# Patient Record
Sex: MALE | Race: WHITE | URBAN - METROPOLITAN AREA
[De-identification: names, ages, dates, MRNs, and addresses within clinical notes are randomized per-mention and may not be internally consistent; named-entity substitution may affect disease eponyms.]

---

## 2023-10-25 PROBLEM — C44.619 BASAL CELL CARCINOMA OF SKIN OF LEFT UPPER LIMB, INCLUDING SHOULDER: Status: ACTIVE | Noted: 2023-06-14

## 2023-10-25 PROBLEM — C44.319 BASAL CELL CARCINOMA OF SKIN OF OTHER PARTS OF FACE: Status: ACTIVE | Noted: 2023-06-14

## 2023-10-25 PROBLEM — L57.0 ACTINIC KERATOSIS: Status: ACTIVE | Noted: 2023-06-14

## 2023-10-25 PROBLEM — D48.5 NEOPLASM OF UNCERTAIN BEHAVIOR OF SKIN: Status: ACTIVE | Noted: 2023-06-14

## 2023-10-27 ENCOUNTER — PROCEDURE VISIT (OUTPATIENT)
Dept: DERMATOLOGY | Facility: CLINIC | Age: 58
End: 2023-10-27
Payer: MEDICARE

## 2023-10-27 DIAGNOSIS — D48.5 NEOPLASM OF UNCERTAIN BEHAVIOR OF SKIN: ICD-10-CM

## 2023-10-27 PROCEDURE — 88305 TISSUE EXAM BY PATHOLOGIST: CPT | Performed by: DERMATOLOGY

## 2023-10-27 PROCEDURE — 88305 TISSUE EXAM BY PATHOLOGIST: CPT | Mod: TC,DER | Performed by: STUDENT IN AN ORGANIZED HEALTH CARE EDUCATION/TRAINING PROGRAM

## 2023-10-27 PROCEDURE — 11440 EXC FACE-MM B9+MARG 0.5 CM/<: CPT | Performed by: STUDENT IN AN ORGANIZED HEALTH CARE EDUCATION/TRAINING PROGRAM

## 2023-10-27 RX ORDER — ASPIRIN 81 MG/1
81 TABLET ORAL DAILY
COMMUNITY

## 2023-10-27 NOTE — PROGRESS NOTES
Slow Mohs Surgery Note    Date of Surgery:  10/27/2023  Surgeon:  Nicholas Marvin MD  Office Location: [facility] [address] [address]  Dept: [phone]  Dept Fax: [phone]  Referring Provider: Nicholas Marvin MD  [facility] [address] [address]     Assessment/Plan   This is planned multi-stage excision for treatment of a melanoma.   Slow Mohs Surgery Stage: 2  Indication: the previous pathology was concerning for atypical melanocytes on the peripheral margin. A re-excision was advised by dermatopathology.     Pre-procedure:   Obtained informed consent: written from patient    Intra-operative:   Audible time out called at : 1:45 PM 10/27/23  by: Roslaee Velasco RN   Verified patient name, birthdate, site, specimen bottle label & requisition.    The planned procedure(s) was again reviewed with the patient. The risks of bleeding, infection, nerve damage and scarring were reviewed. The patient identify, surgical site, and planned procedure(s) were verified.     Neoplasm of uncertain behavior of skin  Right Cheek - D2    Skin excision    Lesion length (cm):  1.4  Lesion width (cm):  0.5  Total excision diameter (cm):  0.5 (0.5 cm margin was obtained from the previous excision wound).  Informed consent: discussed and consent obtained    Timeout: patient name, date of birth, surgical site, and procedure verified    Procedure prep:  Patient was prepped and draped  Anesthesia: the lesion was anesthetized in a standard fashion    Anesthetic:  Lidocaine 2% with epinephrine  Instrument used: #15 blade    Hemostasis achieved with: electrodesiccation    Outcome: patient tolerated procedure well with no complications    Post-procedure details: sterile dressing applied and wound care instructions given    Dressing type: pressure dressing    Additional details:  The specimen was inked, mapped, and sent to pathology for en face examination of all peripheral  margins. If tumor remains in the periphery, additional excision of involved tissue will be performed. If the margins are clear, the wound may be repaired with primary closure to minimize functional and cosmetic distortion.    Staff Communication: Dermatology Local Anesthesia: Other - Drug:       lido w epi            Strength:     2%                Amount: 2.5 ml    Specimen 1 - Dermatopathology- DERM LAB  Differential Diagnosis: slow mohs stage 2 for melanocytic hyperplasia  Check Margins Yes/No?:  yes (slow mohs)  Comments:  specimen D2  Dermpath Lab: Routine Histopathology (formalin-fixed tissue)    After a discussion with the patient regarding the options for wound closure, a decision was made to proceed with second intention healing.    Petrolatum and a sterile dressing were applied to the wound. Wound care was discussed, and the patient was given written post-operative wound care instructions.      The patient will follow up pending pathology results.

## 2023-10-31 ENCOUNTER — OFFICE VISIT (OUTPATIENT)
Dept: DERMATOLOGY | Facility: CLINIC | Age: 58
End: 2023-10-31
Payer: MEDICARE

## 2023-10-31 DIAGNOSIS — Z12.83 ENCOUNTER FOR SCREENING FOR MALIGNANT NEOPLASM OF SKIN: Primary | ICD-10-CM

## 2023-10-31 DIAGNOSIS — D48.5 NEOPLASM OF UNCERTAIN BEHAVIOR OF SKIN: ICD-10-CM

## 2023-10-31 DIAGNOSIS — L72.0 EPIDERMAL CYST: ICD-10-CM

## 2023-10-31 PROCEDURE — 11103 TANGNTL BX SKIN EA SEP/ADDL: CPT | Performed by: NURSE PRACTITIONER

## 2023-10-31 PROCEDURE — 99213 OFFICE O/P EST LOW 20 MIN: CPT | Performed by: NURSE PRACTITIONER

## 2023-10-31 PROCEDURE — 11102 TANGNTL BX SKIN SINGLE LES: CPT | Performed by: NURSE PRACTITIONER

## 2023-10-31 PROCEDURE — 88305 TISSUE EXAM BY PATHOLOGIST: CPT | Mod: TC,DER | Performed by: NURSE PRACTITIONER

## 2023-10-31 PROCEDURE — 88305 TISSUE EXAM BY PATHOLOGIST: CPT | Performed by: DERMATOLOGY

## 2023-10-31 PROCEDURE — 1036F TOBACCO NON-USER: CPT | Performed by: NURSE PRACTITIONER

## 2023-10-31 NOTE — PROGRESS NOTES
Subjective     Chet Reyes is a 57 y.o. male who presents for the following: Suspicious Skin Lesion (Presents to office today for examination of two lesions to scalp, present for weeks and unresolving. PMHX significant for throat, prostate and NMSC. No further complaints.).     Review of Systems:  No other skin or systemic complaints other than what is documented elsewhere in the note.    The following portions of the chart were reviewed this encounter and updated as appropriate:       Skin Cancer History  No skin cancer on file.    Specialty Problems          Dermatology Problems    Actinic keratosis    Basal cell carcinoma of skin of left upper limb, including shoulder    Basal cell carcinoma of skin of other parts of face    Neoplasm of uncertain behavior of skin     Past Medical History:  Chet Reyes  has a past medical history of Basal cell carcinoma, Melanoma (CMS/HCC), Squamous cell skin cancer, and Throat cancer (CMS/HCC).    Past Surgical History:  Chet Reyes  has a past surgical history that includes CT angio coronary art with heartflow if score >30% (10/13/2023) and Fasciotomy.    Family History:  Patient family history is not on file.    Social History:  Chet Reyes  reports that he has quit smoking. His smoking use included cigarettes. He has never used smokeless tobacco. He reports that he does not currently use alcohol. He reports that he does not use drugs.    Allergies:  Patient has no known allergies.    Current Medications / CAM's:    Current Outpatient Medications:     aspirin 81 mg EC tablet, Take 1 tablet (81 mg) by mouth once daily., Disp: , Rfl:      Objective   Well appearing patient in no apparent distress; mood and affect are within normal limits.    A full examination was performed including scalp, head, eyes, ears, nose, lips, neck, chest, axillae, abdomen, back, buttocks, bilateral upper extremities, bilateral lower extremities, hands, feet, fingers, toes, fingernails,  and toenails. All findings within normal limits unless otherwise noted below.    Assessment/Plan   1. Encounter for screening for malignant neoplasm of skin    Related Procedures  Follow Up In Dermatology - Established Patient    2. Neoplasm of uncertain behavior of skin (2)  Mid Frontal Scalp  6mm hyperkeratotic papule          Lesion biopsy  Type of biopsy: tangential    Informed consent: discussed and consent obtained    Timeout: patient name, date of birth, surgical site, and procedure verified    Procedure prep:  Patient was prepped and draped  Anesthesia: the lesion was anesthetized in a standard fashion    Anesthetic:  1% lidocaine w/ epinephrine 1-100,000 local infiltration  Instrument used: DermaBlade    Hemostasis achieved with: aluminum chloride    Outcome: patient tolerated procedure well    Post-procedure details: sterile dressing applied and wound care instructions given    Dressing type: petrolatum and bandage      Specimen 1 - Dermatopathology- DERM LAB  Differential Diagnosis: nmsc  Check Margins Yes/No?:    Comments:    Dermpath Lab: Routine Histopathology (formalin-fixed tissue)    Mid Parietal Scalp  8mm crusted papule          Lesion biopsy  Type of biopsy: tangential    Informed consent: discussed and consent obtained    Timeout: patient name, date of birth, surgical site, and procedure verified    Procedure prep:  Patient was prepped and draped  Anesthesia: the lesion was anesthetized in a standard fashion    Anesthetic:  1% lidocaine w/ epinephrine 1-100,000 local infiltration  Instrument used: DermaBlade    Hemostasis achieved with: aluminum chloride    Outcome: patient tolerated procedure well    Post-procedure details: sterile dressing applied and wound care instructions given    Dressing type: petrolatum and bandage      Specimen 2 - Dermatopathology- DERM LAB  Differential Diagnosis: nmsc  Check Margins Yes/No?:    Comments:    Dermpath Lab: Routine Histopathology (formalin-fixed  tissue)    - Discussed differential with patient in clinic today.   - Given uncertainty in clinical diagnosis, biopsy is recommended in clinic today.  - The patient expressed understanding, is in agreement with this plan, and wishes to proceed with biopsy.  - Oral and written wound care instructions provided.  - Advised patient that the office will call within 2 weeks to discuss biopsy results.        3. Epidermal cyst  Posterior Mid Neck  2.0cm mobile nodule with normal overlying skin    - The nature of the diagnosis was explained.  - Incision and drainage can provide immediate reduction in the cyst. However, this is a temporary measure. After this treatment, a cyst will refill with the cheesy contents because the lining of the cyst has not been removed.   - Excision was discussed with the patient. The risks, benefits and potential adverse effects were reviewed. Discussion included but was not limited to the cure rate, relative cost, wound care requirements, activity restrictions, likely scar outcome and time to heal were reviewed. It was explained that the scar would be longer than the original lesion.    - Will start PA process. If approved, will schedule with derm surgery for excision.

## 2023-11-02 LAB
LABORATORY COMMENT REPORT: NORMAL
PATH REPORT.FINAL DX SPEC: NORMAL
PATH REPORT.GROSS SPEC: NORMAL
PATH REPORT.MICROSCOPIC SPEC OTHER STN: NORMAL
PATH REPORT.RELEVANT HX SPEC: NORMAL
PATH REPORT.TOTAL CANCER: NORMAL

## 2023-11-07 DIAGNOSIS — C44.92 SQUAMOUS CELL CARCINOMA OF SKIN: Primary | ICD-10-CM

## 2023-11-07 NOTE — RESULT ENCOUNTER NOTE
Surgical site: Right Cheek  Date of procedure 10/27/23    Patient informed about pathology result which showed clear margins. No further surgery needed but the patient was advised to follow up with general dermatology. The patient had no further concerns and was advised to call with any questions.

## 2024-01-30 ENCOUNTER — APPOINTMENT (OUTPATIENT)
Dept: DERMATOLOGY | Facility: CLINIC | Age: 59
End: 2024-01-30
Payer: MEDICARE

## 2024-06-06 ENCOUNTER — PROCEDURE VISIT (OUTPATIENT)
Dept: DERMATOLOGY | Facility: CLINIC | Age: 59
End: 2024-06-06
Payer: MEDICARE

## 2024-06-06 VITALS — DIASTOLIC BLOOD PRESSURE: 86 MMHG | SYSTOLIC BLOOD PRESSURE: 130 MMHG | HEART RATE: 71 BPM

## 2024-06-06 DIAGNOSIS — D04.4 SQUAMOUS CELL CARCINOMA IN SITU (SCCIS) OF SCALP: Primary | ICD-10-CM

## 2024-06-06 PROCEDURE — 17311 MOHS 1 STAGE H/N/HF/G: CPT | Performed by: STUDENT IN AN ORGANIZED HEALTH CARE EDUCATION/TRAINING PROGRAM

## 2024-06-06 NOTE — PROGRESS NOTES
Mohs Surgery Operative Note    Date of Surgery:  6/6/2024  Surgeon:  Nicholas Marvin MD  Office Location:  [address] [facility]  [address] [address]  Dept: [phone]  Dept Fax: [phone]  Referring Provider: Susan L Mayne, FRANK-DIMITRI  [address] [place] [id number]      Assessment/Plan   Pre-procedure:   Obtained informed consent: written from patient  The surgical site was identified and confirmed with the patient.     Intra-operative:   Audible time out called at : 8:36 AM 06/06/24  by: Aure Steele RN   Verified patient name, birthdate, site, specimen bottle label & requisition.    The planned procedure(s) was again reviewed with the patient. The risks of bleeding, infection, nerve damage and scarring were reviewed. Written authorization was obtained. The patient identity, surgical site, and planned procedure(s) were verified. The provider acted as both surgeon and pathologist.     Squamous cell carcinoma of skin of scalp and neck  Mid Frontal Scalp    Mohs surgery    Consent obtained: written    Universal Protocol:  Procedure explained and questions answered to patient or proxy's satisfaction: Yes    Test results available and properly labeled: Yes    Pathology report reviewed: Yes    External notes reviewed: Yes    Photo or diagram used for site identification: Yes    Site/side marked: Yes    Slide independently reviewed by Mohs surgeon: Yes    Immediately prior to procedure a time out was called: Yes    Patient identity confirmed: verbally with patient  Preparation: Patient was prepped and draped in usual sterile fashion      Anticoagulation:  Is the patient taking prescription anticoagulant and/or aspirin prescribed/recommended by a physician? Yes    Was the anticoagulation regimen changed prior to Mohs? No      Anesthesia:  Anesthesia method: local infiltration  Local anesthetic: lidocaine 1% WITH epi    Procedure Details:  Biopsy accession  number: [id number]  Date of biopsy: 10/31/2023  Pre-Op diagnosis: squamous cell carcinoma  SCC subtype: in situ  Surgery side: midline  Surgical site (from skin exam): Mid Frontal Scalp  Pre-operative length (cm): 1  Pre-operative width (cm): 1  Indications for Mohs surgery: anatomic location where tissue conservation is critical  Previously treated? No      Micrographic Surgery Details:  Post-operative length (cm): 1.1  Post-operative width (cm): 1  Number of Mohs stages: 1    Stage 1     Comments: The patient was brought into the operating room and placed in the procedure chair in the appropriate position.  The area positive by previous biopsy was identified and confirmed with the patient. The area of clinically obvious tumor was debulked using a curette and/or scalpel as needed. An incision was made following the Mohs approach through the skin. The specimen was taken to the lab, divided into 2 piece(s) and appropriately chromacoded and processed.  Tumor features identified on Mohs section: no tumor identified    Depth of defect: subcutaneous fat    Patient tolerance of procedure: tolerated well, no immediate complications    Reconstruction:  Was the defect reconstructed?: No    Fine/surface layer approximation (top stitches)   Hemostasis achieved with: electrodesiccation  Outcome: patient tolerated procedure well with no complications    Post-procedure details: sterile dressing applied and wound care instructions given    Dressing type: Telfa pad, Gelfoam, pressure dressing and Hypafix        Repair: After a discussion with the patient regarding the options for wound closure, a decision was made to proceed with second intention healing.  Dressing F/U: Surgifoam was placed in the wound. A pressure dressing was placed to help stabilize the wound and to minimize the risk of postoperative bleeding. Wound care was discussed, and the patient was given written post-operative wound care instructions.             The  patient will follow up with Nicholas Marvin MD as needed for any post operative problems or concerns, and will follow up with their primary dermatologist as scheduled.

## 2024-06-06 NOTE — PROGRESS NOTES
Office Visit Note  Date: 6/6/2024  Surgeon:  Nicholas Marvin MD  Office Location:  [address] [place] [address] [address] [address]  Dept: [phone]  Dept Fax: [phone]  Referring Provider: Susan L Mayne, FRANK-DIMITRI  [address] [address]    Subjective   Ceht Reyes is a 58 y.o. male who presents for the following: MOHS Surgery (Mid Frontal Scalp- SCCIS)    According to the patient, the lesion has been present for approximately 1 month at the time of diagnosis.  The lesion is not causing symptoms.  The lesion has not been treated previously.    The patient does not have a pacemaker / defibrillator.  The patient does not have a heart valve / joint replacement.    The patient is on blood thinners.  The patient does not have a history of hepatitis B or C.  The patient does not have a history of HIV.  The patient does not have a history of immunosuppression (e.g. organ transplantation, malignancy, medications)    Review of Systems:  No other skin or systemic complaints other than what is documented elsewhere in the note.    MEDICAL HISTORY: clinically relevant history including significant past medical history, medications and allergies was reviewed and documented in Epic.    Objective   Well appearing patient in no apparent distress; mood and affect are within normal limits.  Vital signs: See record.  Noted on the Mid Frontal Scalp  Is a 1 x 1 cm scar        The patient confirmed the identified site.    Discussion:  The nature of the diagnosis was explained. The lesion is a skin cancer.  It has a risk of local growth and distant spread. The condition is associated with sun exposure.  Warning signs of non-melanoma skin cancer discussed. Patient was instructed to perform monthly self skin examination.  We recommended that the patient have regular full skin exams given an increased risk of subsequent skin cancers. The patient was instructed to use sun  protective behaviors including use of broad spectrum sunscreens and sun protective clothing to reduce risk of skin cancers.      Risks, benefits, side effects of Mohs surgery were discussed with patient and the patient voiced understanding.  It was explained that even though the cure rate of Mohs is very high it is not 100%. Risks of surgery including but not limited to bleeding, infection, numbness, nerve damage, and scar were reviewed.  Discussion included wound care requirements, activity restrictions, likely scar outcome and time to heal.     After Mohs surgery, the defect may need to be repaired surgically and the scar may be longer than the original lesion.  Reconstruction options, risks, and benefits were reviewed including second intention healing, linear repair (4-1 ratio was explained), local flaps, skin grafts, cartilage grafts and interpolation flaps (the need for multiple surgeries was explained). Possible outcomes were reviewed including likely scar appearance, failure of flap survival, infection, bleeding and the need for revision surgery.     The pathology was reviewed, the photograph was reviewed, and the referring physician's note was reviewed.    Patient elected for Mohs surgery.

## 2024-09-20 ENCOUNTER — APPOINTMENT (OUTPATIENT)
Dept: DERMATOLOGY | Facility: CLINIC | Age: 59
End: 2024-09-20
Payer: MEDICARE

## 2024-09-20 DIAGNOSIS — L81.4 LENTIGO: ICD-10-CM

## 2024-09-20 DIAGNOSIS — Z12.83 SCREENING EXAM FOR SKIN CANCER: Primary | ICD-10-CM

## 2024-09-20 DIAGNOSIS — L82.1 SEBORRHEIC KERATOSIS: ICD-10-CM

## 2024-09-20 DIAGNOSIS — D48.5 NEOPLASM OF UNCERTAIN BEHAVIOR OF SKIN: ICD-10-CM

## 2024-09-20 PROCEDURE — 11103 TANGNTL BX SKIN EA SEP/ADDL: CPT | Performed by: NURSE PRACTITIONER

## 2024-09-20 PROCEDURE — 11102 TANGNTL BX SKIN SINGLE LES: CPT | Performed by: NURSE PRACTITIONER

## 2024-09-20 PROCEDURE — 99213 OFFICE O/P EST LOW 20 MIN: CPT | Performed by: NURSE PRACTITIONER

## 2024-09-20 PROCEDURE — 1036F TOBACCO NON-USER: CPT | Performed by: NURSE PRACTITIONER

## 2024-09-20 RX ORDER — LEVOTHYROXINE SODIUM 75 UG/1
75 TABLET ORAL
COMMUNITY
Start: 2024-01-26

## 2024-09-20 RX ORDER — CLONAZEPAM 1 MG/1
1 TABLET ORAL
COMMUNITY
Start: 2024-09-17

## 2024-09-20 RX ORDER — BUPRENORPHINE HYDROCHLORIDE 8 MG/1
TABLET SUBLINGUAL
COMMUNITY
Start: 2022-12-15

## 2024-09-20 RX ORDER — TESTOSTERONE CYPIONATE 200 MG/ML
INJECTION, SOLUTION INTRAMUSCULAR
COMMUNITY
Start: 2024-08-30

## 2024-09-20 NOTE — PROGRESS NOTES
Subjective     Chet Reyes is a 58 y.o. male who presents for the following: Suspicious Skin Lesion.     Review of Systems:  No other skin or systemic complaints other than what is documented elsewhere in the note.    The following portions of the chart were reviewed this encounter and updated as appropriate:          Skin Cancer History  Biopsy Date Type Location Status   10/31/23 SCC in Situ Mid Frontal Scalp Treatment Complete  6/11/24       Specialty Problems          Dermatology Problems    Actinic keratosis    Basal cell carcinoma of skin of left upper limb, including shoulder    Basal cell carcinoma of skin of other parts of face    Neoplasm of uncertain behavior of skin        Objective   Well appearing patient in no apparent distress; mood and affect are within normal limits.    A focused skin examination was performed. All findings within normal limits unless otherwise noted below.    Assessment/Plan   1. Screening exam for skin cancer  Scattered benign lesions. Scar(s) clear no sign of recurrence.     No evidence of recurrence in scar and benign ROS.   Continue with regular total body skin exams.  ABCDEs of melanoma and atypical moles were discussed with the patient.  Patient was instructed to perform monthly self skin examination.  We recommended that the patient have regular full skin exams given an increased risk of subsequent skin cancers.  The patient was instructed to use sun protective behaviors including use of broad spectrum sunscreens and sun protective clothing to reduce risk of skin cancers.    2. Neoplasm of uncertain behavior of skin (4)  D. left lateral malar cheek  Hyperkeratotic 7 mm papule              Lesion biopsy  Type of biopsy: tangential    Informed consent: discussed and consent obtained    Timeout: patient name, date of birth, surgical site, and procedure verified    Procedure prep:  Patient was prepped and draped  Anesthesia: the lesion was anesthetized in a standard fashion     Anesthetic:  1% lidocaine w/ epinephrine 1-100,000 local infiltration  Instrument used: DermaBlade    Hemostasis achieved with: aluminum chloride    Outcome: patient tolerated procedure well    Post-procedure details: sterile dressing applied and wound care instructions given    Dressing type: petrolatum and bandage      Specimen 1 - Dermatopathology- DERM LAB  Differential Diagnosis: r/o NMSC  Check Margins Yes/No?:    Comments:    Dermpath Lab: Routine Histopathology (formalin-fixed tissue)    C. left parietal  5 mm hyperkeratotic papule with surrounding erythema              Lesion biopsy  Type of biopsy: tangential    Informed consent: discussed and consent obtained    Timeout: patient name, date of birth, surgical site, and procedure verified    Procedure prep:  Patient was prepped and draped  Anesthesia: the lesion was anesthetized in a standard fashion    Anesthetic:  1% lidocaine w/ epinephrine 1-100,000 local infiltration  Instrument used: DermaBlade    Hemostasis achieved with: aluminum chloride    Outcome: patient tolerated procedure well    Post-procedure details: sterile dressing applied and wound care instructions given    Dressing type: petrolatum and bandage      Specimen 2 - Dermatopathology- DERM LAB  Differential Diagnosis: r/o NMSC  Check Margins Yes/No?:    Comments:    Dermpath Lab: Routine Histopathology (formalin-fixed tissue)    A. Right frontal  8 mm erythematous, scaly papule              Lesion biopsy  Type of biopsy: tangential    Informed consent: discussed and consent obtained    Timeout: patient name, date of birth, surgical site, and procedure verified    Procedure prep:  Patient was prepped and draped  Anesthesia: the lesion was anesthetized in a standard fashion    Anesthetic:  1% lidocaine w/ epinephrine 1-100,000 local infiltration  Instrument used: DermaBlade    Hemostasis achieved with: aluminum chloride    Outcome: patient tolerated procedure well    Post-procedure details:  sterile dressing applied and wound care instructions given    Dressing type: petrolatum and bandage      Specimen 3 - Dermatopathology- DERM LAB  Differential Diagnosis: r/o NMSC  Check Margins Yes/No?:    Comments:    Dermpath Lab: Routine Histopathology (formalin-fixed tissue)    B. crown  1.2 cm hyperkeratotic plaque              Lesion biopsy  Type of biopsy: tangential    Informed consent: discussed and consent obtained    Timeout: patient name, date of birth, surgical site, and procedure verified    Procedure prep:  Patient was prepped and draped  Anesthesia: the lesion was anesthetized in a standard fashion    Anesthetic:  1% lidocaine w/ epinephrine 1-100,000 local infiltration  Instrument used: DermaBlade    Hemostasis achieved with: aluminum chloride    Outcome: patient tolerated procedure well    Post-procedure details: sterile dressing applied and wound care instructions given    Dressing type: petrolatum and bandage      Specimen 4 - Dermatopathology- DERM LAB  Differential Diagnosis: r/o NMSC  Check Margins Yes/No?:    Comments:    Dermpath Lab: Routine Histopathology (formalin-fixed tissue)    -  Discussed differential with patient.   - Given uncertainty of clinical diagnosis, a biopsy is recommended in clinic today.   - The patient expressed understanding, is in agreement with this plan, and wishes to proceed with biopsy.   - Oral and written wound care instructions provided.   - Advised the patient that the office will call within 2 weeks to discuss biopsy results.     3. Seborrheic keratosis (3)  Head - Anterior (Face), Neck - Anterior, Scalp  Stuck on verrucous, tan-brown papules and plaques.      Although Seborrheic Keratoses can be troublesome and unsightly, they are entirely benign.  Removal of Seborrheic Keratoses is considered a cosmetic procedure. Removal is typically performed using liquid nitrogen cryotherapy.  Treatment of current lesions does not prevent the development of new Seborrheic  Keratoses in the future.    4. Lentigo (4)  Head, Left Arm, Neck, Right Arm  Scattered tan macules in sun-exposed areas.    A solar lentigo (plural, solar lentigines), sometimes called an age spot or liver spot, is a brown macule (small, flat, smooth area of skin) caused by chronic sun or artificial ultraviolet (UV) light exposure. There may be just one lentigo or there may be multiple. This type of lentigo is different from lentigo simplex (discussed separately) because it is caused by exposure to UV light. Solar lentigines are benign, but they do indicate excessive sun exposure, a risk factor for the development of skin cancer.  Lesions are benign, no treatment needed.       Follow up in 12 months for a total body skin exam. Please call me if there are any changes or development of concerning symptoms (lesion/skin condition is changing, bleeding, enlarging, or worsening).

## 2024-09-26 DIAGNOSIS — D04.4 SQUAMOUS CELL CARCINOMA IN SITU (SCCIS) OF SCALP: ICD-10-CM

## 2024-09-26 DIAGNOSIS — C44.329 SQUAMOUS CELL CARCINOMA OF CHEEK: ICD-10-CM

## 2024-09-26 NOTE — RESULT ENCOUNTER NOTE
Pt was contacted and notified of malignant results at this time.  Pt is agreeable to treatment plan. No further questions noted. Pt scheduled for follow up with Anna 03/20/2025 for recheck of AK's. Mohs referral placed today.     Rupal Rudd RN

## 2024-12-06 ENCOUNTER — APPOINTMENT (OUTPATIENT)
Dept: DERMATOLOGY | Facility: CLINIC | Age: 59
End: 2024-12-06
Payer: MEDICARE

## 2024-12-10 ENCOUNTER — TELEPHONE (OUTPATIENT)
Dept: DERMATOLOGY | Facility: CLINIC | Age: 59
End: 2024-12-10
Payer: MEDICARE

## 2024-12-10 NOTE — TELEPHONE ENCOUNTER
LVM for pt regarding canceled mohs appointments on 12/06/2024 and 12/13/2024. Requested callback at pt's earliest convenience.     Rupal Rudd RN

## 2024-12-13 ENCOUNTER — APPOINTMENT (OUTPATIENT)
Dept: DERMATOLOGY | Facility: CLINIC | Age: 59
End: 2024-12-13
Payer: MEDICARE

## 2025-03-20 ENCOUNTER — APPOINTMENT (OUTPATIENT)
Dept: DERMATOLOGY | Facility: CLINIC | Age: 60
End: 2025-03-20
Payer: MEDICARE

## 2025-05-30 ENCOUNTER — APPOINTMENT (OUTPATIENT)
Dept: DERMATOLOGY | Facility: CLINIC | Age: 60
End: 2025-05-30
Payer: MEDICARE

## 2025-05-30 VITALS — HEART RATE: 83 BPM | SYSTOLIC BLOOD PRESSURE: 154 MMHG | DIASTOLIC BLOOD PRESSURE: 84 MMHG

## 2025-05-30 DIAGNOSIS — C44.329 SQUAMOUS CELL CANCER OF SKIN OF LEFT CHEEK: ICD-10-CM

## 2025-05-30 NOTE — PROGRESS NOTES
Office Visit Note  Date: 5/30/2025  Surgeon:  Nicholas Marvin MD  Office Location:  [address] [facility]  [address] [address]  Dept: [phone]  Dept Fax: [phone]  Referring Provider: Susan L Mayne, FRANK-DIMITRI  [address] [address]    Subjective   Chet Reyes is a 59 y.o. 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    According to the patient, the lesion has been present for approximately greater than 1 year at the time of diagnosis.  The lesion is itchy and painful.  The lesion has not been treated previously.    Additionally, the patient has a new lesion of concern on the posterior neck. He states the lesion is growing and irritating. It has not been treated before.     The patient does not have a pacemaker / defibrillator.  The patient does not have a heart valve / joint replacement.    The patient is not on blood thinners.  The patient does not have a history of hepatitis B or C.  The patient does not have a history of HIV.  The patient does not have a history of immunosuppression (e.g. organ transplantation, malignancy, medications)    Review of Systems:  No other skin or systemic complaints other than what is documented elsewhere in the note.    MEDICAL HISTORY: clinically relevant history including significant past medical history, medications and allergies was reviewed and documented in Epic.    Objective   Well appearing patient in no apparent distress; mood and affect are within normal limits.  Vital signs: See record.  Noted on the   Left Lateral Malar Cheek  Is a 1 x 1 cm scar  The patient confirmed the identified site.    Also, on the posterior neck, there is a 2 x 2 cm subcutaneous nodule     Discussion:    #Squamous cell carcinoma - left lateral malar cheek   The nature of the diagnosis of the squamous cell carcinoma on the left lateral malar cheek was  explained. The lesion is a skin cancer.  It has a risk of local growth and distant spread. The condition is associated with sun exposure.  Warning signs of non-melanoma skin cancer discussed. Patient was instructed to perform monthly self skin examination.  We recommended that the patient have regular full skin exams given an increased risk of subsequent skin cancers. The patient was instructed to use sun protective behaviors including use of broad spectrum sunscreens and sun protective clothing to reduce risk of skin cancers.      Risks, benefits, side effects of Mohs surgery were discussed with patient and the patient voiced understanding.  It was explained that even though the cure rate of Mohs is very high it is not 100%. Risks of surgery including but not limited to bleeding, infection, numbness, nerve damage, and scar were reviewed.  Discussion included wound care requirements, activity restrictions, likely scar outcome and time to heal.     After Mohs surgery, the defect may need to be repaired surgically and the scar may be longer than the original lesion.  Reconstruction options, risks, and benefits were reviewed including second intention healing, linear repair (4-1 ratio was explained), local flaps, skin grafts, cartilage grafts and interpolation flaps (the need for multiple surgeries was explained). Possible outcomes were reviewed including likely scar appearance, failure of flap survival, infection, bleeding and the need for revision surgery.     The pathology was reviewed, the photograph was reviewed, and the referring physician's note was reviewed.    Patient elected for Mohs surgery.    #Neoplasm of uncertain behavior - posterior neck   The possible diagnoses for the neoplasm of uncertain behavior on the posterior neck were explained including a cyst or a lipoma. Although the lesion is likely benign, the patient requests removal of the lesion because of the symptoms it is causing (e.g. growing).  Excision was discussed with the patient. The risks, benefits and potential adverse effects were reviewed. Discussion included but was not limited to the cure rate, relative cost, wound care requirements, activity restrictions, likely scar outcome and time to heal were reviewed. Alternative options including monitoring the lesion were discussed. The patient would like to proceed with excision of the lesion if insurance will cover the procedure.        ISully RN  am scribing for, and in the presence of Nicholas Marvin MD    INicholas MD, personally performed the services described in the documentation as scribed by Sully Early RN in my presence, and confirm it is both accurate and complete.

## 2025-05-30 NOTE — PROGRESS NOTES
Mohs Surgery Operative Note    Date of Surgery:  5/30/2025  Surgeon:  Nicholas Marvin MD  Office Location:  [address] [facility]  [address] [address]  Dept: [phone]  Dept Fax: [phone]  Referring Provider: Susan L Mayne, FRANK-DIMITRI  [address] [address]      Assessment/Plan   Pre-procedure:   Obtained informed consent: written from patient  The surgical site was identified and confirmed with the patient.     Intra-operative:   Audible time out called at : 08:38 AM 05/30/25  by: CHRIS ANTONIO RN   Verified patient name, birthdate, site, specimen bottle label & requisition.    The planned procedure(s) was again reviewed with the patient. The risks of bleeding, infection, nerve damage and scarring were reviewed. Written authorization was obtained. The patient identity, surgical site, and planned procedure(s) were verified. The provider acted as both surgeon and pathologist.     SQUAMOUS CELL CANCER OF SKIN OF LEFT CHEEK  Left Lateral Malar Cheek  Mohs surgery    Consent obtained: written    Universal Protocol:  Procedure explained and questions answered to patient or proxy's satisfaction: Yes    Test results available and properly labeled: Yes    Pathology report reviewed: Yes    External notes reviewed: Yes    Photo or diagram used for site identification: Yes    Site/side marked: Yes    Slide independently reviewed by Mohs surgeon: Yes    Immediately prior to procedure a time out was called: Yes    Patient identity confirmed: verbally with patient  Preparation: Patient was prepped and draped in usual sterile fashion      Anticoagulation:  Is the patient taking prescription anticoagulant and/or aspirin prescribed/recommended by a physician? No    Was the anticoagulation regimen changed prior to Mohs? No      Anesthesia:  Anesthesia method: local infiltration  Local anesthetic: lidocaine 1% WITH epi    Procedure Details:  Case ID  Number: [id number]  Biopsy accession number: [id number]  Date of biopsy: 9/20/2024  Pre-Op diagnosis: squamous cell carcinoma  SCC subtype: moderately differentiated  Surgical site (from skin exam): Left Lateral Malar Cheek  Pre-operative length (cm): 1  Pre-operative width (cm): 1  Indications for Mohs surgery: anatomic location where tissue conservation is critical  Previously treated? No      Micrographic Surgery Details:  Post-operative length (cm): 1  Post-operative width (cm): 1  Number of Mohs stages: 1    Stage 1     Comments: The patient was brought into the operating room and placed in the procedure chair in the appropriate position.  The area positive by previous biopsy was identified and confirmed with the patient. The area of clinically obvious tumor was debulked using a curette and/or scalpel as needed. An incision was made following the Mohs approach through the skin. The specimen was taken to the lab, divided into 2 piece(s) and appropriately chromacoded and processed.                 Tumor features identified on Mohs section: no tumor identified    Depth of defect: subcutaneous fat    Patient tolerance of procedure: tolerated well, no immediate complications    Reconstruction:  Was the defect reconstructed? Yes    Was reconstruction performed by the same Mohs surgeon? Yes    Setting of reconstruction: outpatient office  When was reconstruction performed? same day  Type of reconstruction: linear  Linear reconstruction: complex  Length of linear repair (cm): 2.9  Subcutaneous Layers (Deep Stitches)   Suture size:  5-0  Suture type:  Vicryl  Stitches:  Buried vertical mattress  Fine/surface layer approximation (top stitches)   Epidermal/Superficial suture size:  5-0  Epidermal/Superficial suture type:  Fast-absorbing gut  Stitches: simple interrupted and simple running    Hemostasis achieved with: electrodesiccation  Outcome: patient tolerated procedure well with no complications    Post-procedure  details: sterile dressing applied and wound care instructions given    Dressing type: pressure dressing      Complex Linear Repair - Wide Undermining:  Given the location and size of the defect, it was determined that a complex layered linear closure was required to restore normal anatomy and function. The repair was considered complex because extensive undermining was required and performed. The amount of undermining performed was greater than the maximum width of the defect as measured perpendicular to the closure line along at least one entire edge of the defect. Standing cutaneous cones were removed using Burow's triangles. The wound edges were brought into close approximation with buried vertical mattress sutures. The remainder of the wound was then closed with epidermal top sutures.        The final repair measured 2.9 cm                Wound care was discussed, and the patient was given written post-operative wound care instructions.      The patient will follow up with Nicholas Marvin MD as needed for any post operative problems or concerns, and will follow up with their primary dermatologist as scheduled.       Sully CROWE RN  am scribing for, and in the presence of MD AMRITA Reece Jake X Wang, MD, personally performed the services described in the documentation as scribed by Sully Early RN  in my presence, and confirm it is both accurate and complete.

## 2025-06-12 ENCOUNTER — APPOINTMENT (OUTPATIENT)
Dept: DERMATOLOGY | Facility: CLINIC | Age: 60
End: 2025-06-12
Payer: MEDICARE

## 2025-07-01 ENCOUNTER — APPOINTMENT (OUTPATIENT)
Dept: DERMATOLOGY | Facility: CLINIC | Age: 60
End: 2025-07-01
Payer: MEDICARE

## 2025-07-01 VITALS — DIASTOLIC BLOOD PRESSURE: 82 MMHG | SYSTOLIC BLOOD PRESSURE: 116 MMHG | HEART RATE: 88 BPM

## 2025-07-01 DIAGNOSIS — D04.4 SQUAMOUS CELL CARCINOMA IN SITU (SCCIS) OF SCALP: ICD-10-CM

## 2025-07-01 PROCEDURE — 17312 MOHS ADDL STAGE: CPT | Performed by: STUDENT IN AN ORGANIZED HEALTH CARE EDUCATION/TRAINING PROGRAM

## 2025-07-01 PROCEDURE — 17311 MOHS 1 STAGE H/N/HF/G: CPT | Performed by: STUDENT IN AN ORGANIZED HEALTH CARE EDUCATION/TRAINING PROGRAM

## 2025-07-01 NOTE — PROGRESS NOTES
Office Visit Note  Date: 7/1/2025  Surgeon:  Nicholas Marvin MD  Office Location:  [address] [place] [id number] [address]  Dept: [phone]  Dept Fax: [phone]  Referring Provider: Susan L Mayne, FRANK-DIMITRI  [address] [address]    Subjective   Chet Reyes is a 59 y.o. male who presents for the following: MOHS Surgery (Left Parietal )    According to the patient, the lesion has been present for approximately greater than 1 year at the time of diagnosis.  The lesion is itchy.  The lesion has not been treated previously.    The patient does not have a pacemaker / defibrillator.  The patient does not have a heart valve / joint replacement.    The patient is on blood thinners.  The patient does not have a history of hepatitis B or C.  The patient does not have a history of HIV.  The patient does not have a history of immunosuppression (e.g. organ transplantation, malignancy, medications)    Review of Systems:  No other skin or systemic complaints other than what is documented elsewhere in the note.    MEDICAL HISTORY: clinically relevant history including significant past medical history, medications and allergies was reviewed and documented in Epic.    Objective   Well appearing patient in no apparent distress; mood and affect are within normal limits.  Vital signs: See record.  Noted on the   Left Parietal  Is a 1.2 x 1.2 cm scar    The patient confirmed the identified site.    Discussion:  The nature of the diagnosis was explained. The lesion is a skin cancer.  It has a risk of local growth and distant spread. The condition is associated with sun exposure.  Warning signs of non-melanoma skin cancer discussed. Patient was instructed to perform monthly self skin examination.  We recommended that the patient have regular full skin exams given an increased risk of subsequent skin cancers. The patient was instructed to use sun protective  behaviors including use of broad spectrum sunscreens and sun protective clothing to reduce risk of skin cancers.      Risks, benefits, side effects of Mohs surgery were discussed with patient and the patient voiced understanding.  It was explained that even though the cure rate of Mohs is very high it is not 100%. Risks of surgery including but not limited to bleeding, infection, numbness, nerve damage, and scar were reviewed.  Discussion included wound care requirements, activity restrictions, likely scar outcome and time to heal.     After Mohs surgery, the defect may need to be repaired surgically and the scar may be longer than the original lesion.  Reconstruction options, risks, and benefits were reviewed including second intention healing, linear repair (4-1 ratio was explained), local flaps, skin grafts, cartilage grafts and interpolation flaps (the need for multiple surgeries was explained). Possible outcomes were reviewed including likely scar appearance, failure of flap survival, infection, bleeding and the need for revision surgery.     The pathology was reviewed, the photograph was reviewed, and the referring physician's note was reviewed.    Patient elected for Mohs surgery.     ISully RN  am scribing for, and in the presence of MD AMRITA Reece Jake X Wang, MD, personally performed the services described in the documentation as scribed by Sully Early RN in my presence, and confirm it is both accurate and complete.

## 2025-07-01 NOTE — PROGRESS NOTES
Mohs Surgery Operative Note    Date of Surgery:  7/1/2025  Surgeon:  Nicholas Marvin MD  Office Location:  [address] [facility]  [address] [address]  Dept: [phone]  Dept Fax: [phone]  Referring Provider: Susan L Mayne, FRANK-DIMITRI  [address] [address]      Assessment/Plan   Pre-procedure:   Obtained informed consent: written from patient  The surgical site was identified and confirmed with the patient.     Intra-operative:   Audible time out called at : 1:50 PM 07/01/25  by: CHRIS ANTONIO RN   Verified patient name, birthdate, site, specimen bottle label & requisition.    The planned procedure(s) was again reviewed with the patient. The risks of bleeding, infection, nerve damage and scarring were reviewed. Written authorization was obtained. The patient identity, surgical site, and planned procedure(s) were verified. The provider acted as both surgeon and pathologist.     SQUAMOUS CELL CARCINOMA IN SITU (SCCIS) OF SCALP  Left Parietal  Mohs surgery    Consent obtained: written    Universal Protocol:  Procedure explained and questions answered to patient or proxy's satisfaction: Yes    Test results available and properly labeled: Yes    Pathology report reviewed: Yes    External notes reviewed: Yes    Photo or diagram used for site identification: Yes    Site/side marked: Yes    Slide independently reviewed by Mohs surgeon: Yes    Immediately prior to procedure a time out was called: Yes    Patient identity confirmed: verbally with patient  Preparation: Patient was prepped and draped in usual sterile fashion      Anticoagulation:  Is the patient taking prescription anticoagulant and/or aspirin prescribed/recommended by a physician? Yes    Was the anticoagulation regimen changed prior to Mohs? No      Anesthesia:  Anesthesia method: local infiltration  Local anesthetic: lidocaine 1% WITH epi    Procedure Details:  Case ID Number:  [id number]  Biopsy accession number: [id number]  Date of biopsy: 9/20/2024  Pre-Op diagnosis: squamous cell carcinoma  SCC subtype: in situ  Surgical site (from skin exam): Left Parietal  Pre-operative length (cm): 1.2  Pre-operative width (cm): 1.2  Indications for Mohs surgery: anatomic location where tissue conservation is critical  Previously treated? No      Micrographic Surgery Details:  Post-operative length (cm): 1.7  Post-operative width (cm): 1.2  Number of Mohs stages: 2    Stage 1     Comments: The patient was brought into the operating room and placed in the procedure chair in the appropriate position.  The area positive by previous biopsy was identified and confirmed with the patient. The area of clinically obvious tumor was debulked using a curette and/or scalpel as needed. An incision was made following the Mohs approach through the skin. The specimen was taken to the lab, divided into 2 piece(s) and appropriately chromacoded and processed.    Tumor was seen on the lateral margins as indicated on the on the Mohs map.  Squamous cell carcinoma in situ. Histologic examination revealed enlarged, atypical keratinocytes with large nuclear to cytoplasmic ratio extending throughout the full thickness of an epidermis.            Tumor features identified on Mohs section: no tumor identified      Depth of invasion: Epidermis    Stage 2     Comments: The area of positivity as noted on the Mohs map in the previous stage was identified and removed using the Mohs technique. The specimen was taken to the lab and appropriately chromacoded and processed in 1 piece(s).    Actinic keratosis was noted on the peripheral margin but not definitive skin cancer. The precancerous nature of the lesion was discussed with the patient, who was advised to follow up for treatment if he notes persistence or recurrence of a lesion at the surgical site.        Tumor features identified on Mohs section: no tumor identified    Depth of  defect: subcutaneous fat    Patient tolerance of procedure: tolerated well, no immediate complications    Reconstruction:  Was the defect reconstructed?: No     Repair: After a discussion with the patient regarding the options for wound closure, a decision was made to proceed with second intention healing.    Dressing/Follow-up: Surgifoam was placed in the wound. A pressure dressing was placed to help stabilize the wound and to minimize the risk of postoperative bleeding. Wound care was discussed, and the patient was given written post-operative wound care instructions.          Staff Communication: Dermatology Local Anesthesia: Site Location: Left Parietal 1 % Lidocaine / Epinephrine - Amount:2 cc        The patient will follow up with Nicholas Marvin MD as needed for any post operative problems or concerns, and will follow up with their primary dermatologist as scheduled.       ISully RN  am scribing for, and in the presence of MD AMRITA Reece Jake X Wang, MD, personally performed the services described in the documentation as scribed by Sully Early RN in my presence, and confirm it is both accurate and complete.

## 2025-07-03 ENCOUNTER — APPOINTMENT (OUTPATIENT)
Dept: DERMATOLOGY | Facility: CLINIC | Age: 60
End: 2025-07-03
Payer: MEDICARE

## 2025-07-03 DIAGNOSIS — Z12.83 SCREENING EXAM FOR SKIN CANCER: ICD-10-CM

## 2025-07-03 DIAGNOSIS — L81.4 LENTIGO: ICD-10-CM

## 2025-07-03 DIAGNOSIS — D18.01 HEMANGIOMA OF SKIN: ICD-10-CM

## 2025-07-03 DIAGNOSIS — Z12.83 ENCOUNTER FOR SCREENING FOR MALIGNANT NEOPLASM OF SKIN: ICD-10-CM

## 2025-07-03 DIAGNOSIS — D48.5 NEOPLASM OF UNCERTAIN BEHAVIOR OF SKIN: Primary | ICD-10-CM

## 2025-07-03 PROCEDURE — 99213 OFFICE O/P EST LOW 20 MIN: CPT | Performed by: NURSE PRACTITIONER

## 2025-07-03 PROCEDURE — 11102 TANGNTL BX SKIN SINGLE LES: CPT | Performed by: NURSE PRACTITIONER

## 2025-07-03 PROCEDURE — 1036F TOBACCO NON-USER: CPT | Performed by: NURSE PRACTITIONER

## 2025-07-03 PROCEDURE — 11103 TANGNTL BX SKIN EA SEP/ADDL: CPT | Performed by: NURSE PRACTITIONER

## 2025-07-03 PROCEDURE — 69100 BIOPSY OF EXTERNAL EAR: CPT | Performed by: NURSE PRACTITIONER

## 2025-07-03 RX ORDER — TAMSULOSIN HYDROCHLORIDE 0.4 MG/1
0.4 CAPSULE ORAL DAILY
COMMUNITY
Start: 2024-10-22

## 2025-07-03 RX ORDER — TRAZODONE HYDROCHLORIDE 50 MG/1
50 TABLET ORAL
COMMUNITY
Start: 2025-07-03

## 2025-07-03 RX ORDER — BUPRENORPHINE HYDROCHLORIDE AND NALOXONE HYDROCHLORIDE DIHYDRATE 8; 2 MG/1; MG/1
1 TABLET SUBLINGUAL 3 TIMES DAILY
COMMUNITY

## 2025-07-03 NOTE — PROGRESS NOTES
Subjective     Chet Reyes is a 59 y.o. male who presents for the following: Suspicious Skin Lesion (Head and face. 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 ).          Review of Systems:  No other skin or systemic complaints other than what is documented elsewhere in the note.    The following portions of the chart were reviewed this encounter and updated as appropriate:  Tobacco  Allergies  Meds  Problems  Med Hx  Surg Hx  Fam Hx         Skin Cancer History  Biopsy Log Book  Biopsied Type Location Status   10/31/23 SCC in Situ Mid Frontal Scalp Treatment Complete  7/1/25   10/31/23 Actinic Keratosis Mid Parietal Scalp Treatment Complete  7/1/25 9/20/24 Actinic Keratosis A. RIGHT PARIETAL SCALP Certified letter sent  7/1/25 9/20/24 Actinic Keratosis B. CROWN Certified letter sent  7/1/25 9/20/24 SCC D. LEFT LATERAL MALAR CHEEK Treatment Complete - Mohs  7/1/25 9/20/24 SCC in Situ C. LEFT PARIETAL Treatment Complete - Mohs  7/1/25       Additional History      Specialty Problems          Dermatology Problems    Actinic keratosis    Basal cell carcinoma of skin of left upper limb, including shoulder    Basal cell carcinoma of skin of other parts of face    Neoplasm of uncertain behavior of skin     Past Medical History:  Chet Reyes  has a past medical history of Basal cell carcinoma, Melanoma (Multi), Squamous cell skin cancer, and Throat cancer (Multi).    Past Surgical History:  Chet Reyes  has a past surgical history that includes CT angio coronary art with heartflow if score >30% (10/13/2023) and Fasciotomy.    Family History:  Patient family history is not on file.    Social History:  Chet Reyes  reports that he has quit smoking. His smoking use included cigarettes. He has never used smokeless tobacco. He reports that he does not currently use alcohol. He reports that he does not use drugs.    Allergies:  Patient has no known  allergies.    Current Medications / CAM's:  Current Medications[1]     Objective   Well appearing patient in no apparent distress; mood and affect are within normal limits.    A focused skin examination was performed. All findings within normal limits unless otherwise noted below. Declined a total body skin check.     Assessment/Plan   Skin Exam  1. NEOPLASM OF UNCERTAIN BEHAVIOR OF SKIN (3)  Left conchal bowl  Erythematous 6 mm scaly papule.    Lesion biopsy  Type of biopsy: tangential    Informed consent: discussed and consent obtained    Timeout: patient name, date of birth, surgical site, and procedure verified    Procedure prep:  Patient was prepped and draped  Anesthesia: the lesion was anesthetized in a standard fashion    Anesthetic:  1% lidocaine w/ epinephrine 1-100,000 local infiltration  Instrument used: DermaBlade    Hemostasis achieved with: aluminum chloride    Outcome: patient tolerated procedure well    Post-procedure details: sterile dressing applied and wound care instructions given    Dressing type: petrolatum and bandage    Specimen 1 - Dermatopathology- DERM LAB  Differential Diagnosis: R/O NMSC  Check Margins Yes/No?:    Comments:    Dermpath Lab: Routine Histopathology (formalin-fixed tissue)  Right parietal scalp  Erythematous 8 mm scaly papule.    Lesion biopsy  Type of biopsy: tangential    Informed consent: discussed and consent obtained    Timeout: patient name, date of birth, surgical site, and procedure verified    Procedure prep:  Patient was prepped and draped  Anesthesia: the lesion was anesthetized in a standard fashion    Anesthetic:  1% lidocaine w/ epinephrine 1-100,000 local infiltration  Instrument used: DermaBlade    Hemostasis achieved with: aluminum chloride    Outcome: patient tolerated procedure well    Post-procedure details: sterile dressing applied and wound care instructions given    Dressing type: petrolatum and bandage    Specimen 2 - Dermatopathology- DERM  LAB  Differential Diagnosis: R/O NMSC  Check Margins Yes/No?:    Comments:    Dermpath Lab: Routine Histopathology (formalin-fixed tissue)  Right NLF  7 mm smooth pink papule with arborizing telangiectasia on dermoscopy.     Lesion biopsy  Type of biopsy: tangential    Informed consent: discussed and consent obtained    Timeout: patient name, date of birth, surgical site, and procedure verified    Procedure prep:  Patient was prepped and draped  Anesthesia: the lesion was anesthetized in a standard fashion    Anesthetic:  1% lidocaine w/ epinephrine 1-100,000 local infiltration  Instrument used: DermaBlade    Hemostasis achieved with: aluminum chloride    Outcome: patient tolerated procedure well    Post-procedure details: sterile dressing applied and wound care instructions given    Dressing type: petrolatum and bandage    Specimen 3 - Dermatopathology- DERM LAB  Differential Diagnosis: R/O NMSC  Check Margins Yes/No?:    Comments:    Dermpath Lab: Routine Histopathology (formalin-fixed tissue)  -  Discussed differential with patient.   - Given uncertainty of clinical diagnosis, a biopsy is recommended in clinic today.   - The patient expressed understanding, is in agreement with this plan, and wishes to proceed with biopsy.   - Oral and written wound care instructions provided.   - Advised the patient that the office will call within 2 weeks to discuss biopsy results.   2. ENCOUNTER FOR SCREENING FOR MALIGNANT NEOPLASM OF SKIN      3. SCREENING EXAM FOR SKIN CANCER (5)  Abdomen (Lower Torso, Anterior), Chest (Upper Torso, Anterior), Head, Neck, Torso - Posterior (Back)  Scattered benign lesions. Scar(s) clear no sign of recurrence.   No evidence of recurrence in scar and benign ROS.   Continue with regular total body skin exams.  ABCDEs of melanoma and atypical moles were discussed with the patient.  Patient was instructed to perform monthly self skin examination.  We recommended that the patient have regular full  skin exams given an increased risk of subsequent skin cancers.  The patient was instructed to use sun protective behaviors including use of broad spectrum sunscreens and sun protective clothing to reduce risk of skin cancers.  4. HEMANGIOMA OF SKIN  Torso - Posterior (Back)  Violaceous/red papule with maroon lagoons   - A cherry hemangioma is a small macule (small, flat, smooth area) or papule (small, solid bump) formed from an overgrowth of tiny blood vessels in the skin. Cherry hemangiomas are characteristically red or purplish in color. They often first appear in middle adulthood and usually increase in number with age. Cherry hemangiomas are noncancerous (benign) and are common in adults.  - Lesions are benign, reassured patient.   5. LENTIGO (5)  Abdomen (Lower Torso, Anterior), Chest (Upper Torso, Anterior), Head, Neck, Torso - Posterior (Back)  Scattered tan macules in sun-exposed areas.  A solar lentigo (plural, solar lentigines), sometimes called an age spot or liver spot, is a brown macule (small, flat, smooth area of skin) caused by chronic sun or artificial ultraviolet (UV) light exposure. There may be just one lentigo or there may be multiple. This type of lentigo is different from lentigo simplex (discussed separately) because it is caused by exposure to UV light. Solar lentigines are benign, but they do indicate excessive sun exposure, a risk factor for the development of skin cancer.  Lesions are benign, no treatment needed.     Follow up in 6 months. Please call me if there are any changes or development of concerning symptoms (lesion/skin condition is changing, bleeding, enlarging, or worsening).         [1]   Current Outpatient Medications:     aspirin 81 mg EC tablet, Take 1 tablet (81 mg) by mouth once daily. (Patient not taking: Reported on 5/30/2025), Disp: , Rfl:     buprenorphine (Subtex) 8 mg, place 1 tablet under the tongue twice a day, Disp: , Rfl:     buprenorphine-naloxone (Suboxone)  8-2 mg SL tablet, Place 1 tablet under the tongue 3 times a day., Disp: , Rfl:     clonazePAM (KlonoPIN) 1 mg tablet, Take 1 tablet (1 mg) by mouth every 12 hours., Disp: , Rfl:     levothyroxine (Synthroid, Levoxyl) 75 mcg tablet, Take 1 tablet (75 mcg) by mouth., Disp: , Rfl:     tamsulosin (Flomax) 0.4 mg 24 hr capsule, Take 1 capsule (0.4 mg) by mouth once daily., Disp: , Rfl:     testosterone cypionate (Depo-Testosterone) 200 mg/mL injection, Inject 120mg (0.6mL) as directed every 7 days. Disregard the remaining 0.4mL., Disp: , Rfl:     traZODone (Desyrel) 50 mg tablet, Take 1 tablet (50 mg) by mouth., Disp: , Rfl:

## 2025-07-09 LAB
LAB AP ASR DISCLAIMER: NORMAL
LABORATORY COMMENT REPORT: NORMAL
PATH REPORT.FINAL DX SPEC: NORMAL
PATH REPORT.GROSS SPEC: NORMAL
PATH REPORT.MICROSCOPIC SPEC OTHER STN: NORMAL
PATH REPORT.RELEVANT HX SPEC: NORMAL
PATH REPORT.TOTAL CANCER: NORMAL

## 2025-07-14 DIAGNOSIS — D04.22 SQUAMOUS CELL CARCINOMA IN SITU (SCCIS) OF SKIN OF LEFT EAR: ICD-10-CM

## 2025-07-24 ENCOUNTER — TELEPHONE (OUTPATIENT)
Dept: DERMATOLOGY | Facility: CLINIC | Age: 60
End: 2025-07-24
Payer: MEDICARE

## 2025-07-24 NOTE — TELEPHONE ENCOUNTER
Called patient to see if he can come in for his appointment earlier tomorrow, left voicemail. Awaiting call back.

## 2025-07-25 ENCOUNTER — APPOINTMENT (OUTPATIENT)
Dept: DERMATOLOGY | Facility: CLINIC | Age: 60
End: 2025-07-25
Payer: MEDICARE

## 2025-07-25 VITALS — SYSTOLIC BLOOD PRESSURE: 119 MMHG | DIASTOLIC BLOOD PRESSURE: 81 MMHG | HEART RATE: 80 BPM

## 2025-07-25 DIAGNOSIS — D04.22 SQUAMOUS CELL CARCINOMA IN SITU (SCCIS) OF SKIN OF LEFT EAR: ICD-10-CM

## 2025-07-25 DIAGNOSIS — D48.5 NEOPLASM OF UNCERTAIN BEHAVIOR OF SKIN: ICD-10-CM

## 2025-07-25 NOTE — PROGRESS NOTES
Biopsy Note    Date of Surgery:  7/25/2025  Surgeon:  Nicholas Marvin MD  Office Location:  [address] [facility]  [address] [address]  Dept: [phone]  Dept Fax: [phone]  Referring Provider: Susan L Mayne, APRN-DIMITRI  [address] [address]    Subjective   Chet Reyes is a 59 y.o. male presents for a lesion of concern on the right antihelix (in addition to Mohs surgery for the squamous cell carcinoma in situ on the left conchal bowl).     According to the patient, the lesion has been present for several weeks. It has been painful. It has not been treated previously.     The following portions of the chart were reviewed this encounter and updated as appropriate:         Objective  On the right antihelix is a 0.5 x 0.5 cm scaly papule     Assessment/Plan     #Neoplasm of uncertain behavior - right antihelix  A skin biopsy of the right antihelix (ear) is indicated to rule out squamous cell carcinoma. The patient has a history of multiple squamous cell carcinomas.     Pre-procedure:   Obtained informed consent: written from patient  The surgical site was identified and confirmed with the patient.     Intra-operative:   Audible time out called at : 12:10 PM 07/25/25  by: HERNAN DUTTON RN   Verified patient name, birthdate, site, specimen bottle label & requisition.    The planned procedure(s) was again reviewed with the patient. The risks of bleeding, infection, nerve damage and scarring were reviewed. The patient identity, surgical site, and planned procedure(s) were verified.     Neoplasm of uncertain behavior of skin  Right Antihelix    Lesion biopsy  Type of biopsy: tangential    Informed consent: discussed and consent obtained    Timeout: patient name, date of birth, surgical site, and procedure verified    Procedure prep:  Patient was prepped and draped  Anesthesia: the lesion was anesthetized in a standard fashion     Anesthetic:  1% lidocaine w/ epinephrine 1-100,000 local infiltration  Instrument used: DermaBlade    Hemostasis achieved with: aluminum chloride    Outcome: patient tolerated procedure well    Post-procedure details: sterile dressing applied and wound care instructions given    Dressing type: petrolatum and bandage      Staff Communication: Dermatology Local Anesthesia: Site Location: Right Antihelix 1 % Lidocaine / Epinephrine - Amount: 0.4cc    Specimen 1 - Dermatopathology- DERM LAB  Differential Diagnosis: NUB r/o SCC  Check Margins Yes/No?:  NO  Comments:    Dermpath Lab: Routine Histopathology (formalin-fixed tissue)      The patient will follow up with Nicholas Marvin MD pending biopsy results.

## 2025-07-25 NOTE — PROGRESS NOTES
Mohs Surgery Operative Note    Date of Surgery:  7/25/2025  Surgeon:  Nicholas Marvin MD  Office Location:  [address] [facility]  [address] [address]  Dept: [phone]  Dept Fax: [phone]  Referring Provider: Susan L Mayne, FRANK-DIMITRI  [address] [address]      Assessment/Plan   Pre-procedure:   Obtained informed consent: written from patient  The surgical site was identified and confirmed with the patient.     Intra-operative:   Audible time out called at : 11:40 AM 07/25/25  by: HERNAN DUTTON RN   Verified patient name, birthdate, site, specimen bottle label & requisition.    The planned procedure(s) was again reviewed with the patient. The risks of bleeding, infection, nerve damage and scarring were reviewed. Written authorization was obtained. The patient identity, surgical site, and planned procedure(s) were verified. The provider acted as both surgeon and pathologist.     SQUAMOUS CELL CARCINOMA IN SITU (SCCIS) OF SKIN OF LEFT EAR  Left Conchal Bowl  - Mohs surgery    Consent obtained: written    Universal Protocol:  Procedure explained and questions answered to patient or proxy's satisfaction: Yes    Test results available and properly labeled: Yes    Pathology report reviewed: Yes    External notes reviewed: Yes    Photo or diagram used for site identification: Yes    Site/side marked: Yes    Slide independently reviewed by Mohs surgeon: Yes    Immediately prior to procedure a time out was called: Yes    Patient identity confirmed: verbally with patient  Preparation: Patient was prepped and draped in usual sterile fashion      Anticoagulation:  Is the patient taking prescription anticoagulant and/or aspirin prescribed/recommended by a physician? No    Was the anticoagulation regimen changed prior to Mohs? No      Anesthesia:  Anesthesia method: local infiltration  Local anesthetic: lidocaine 1% WITH epi    Procedure  Details:  Case ID Number: [id number]  Biopsy accession number: [id number]  Date of biopsy: 7/3/2025  Pre-Op diagnosis: squamous cell carcinoma  SCC subtype: in situ  Surgical site (from skin exam): Left Conchal Bowl  Pre-operative length (cm): 1  Pre-operative width (cm): 0.9  Indications for Mohs surgery: anatomic location where tissue conservation is critical    Micrographic Surgery Details:  Post-operative length (cm): 1.5  Post-operative width (cm): 1.3  Number of Mohs stages: 2    Stage 1     Comments: The patient was brought into the operating room and placed in the procedure chair in the appropriate position.  The area positive by previous biopsy was identified and confirmed with the patient. The area of clinically obvious tumor was debulked using a curette and/or scalpel as needed. An incision was made following the Mohs approach through the skin. The specimen was taken to the lab, divided into 2 piece(s) and appropriately chromacoded and processed.  Tumor was seen on the lateral and deep margins as indicated on the on the Mohs map.  Squamous cell carcinoma in situ. Histologic examination revealed enlarged, atypical keratinocytes with large nuclear to cytoplasmic ratio extending throughout the full thickness of an epidermis. AND  Invasive squamous cell carcinoma. Histologic examination revealed atypical keratinocytes with large nuclear to cytoplasmic ratio.            Tumor features identified on Mohs section: squamous cell carcinoma      Depth of invasion: perichondrium    Stage 2     Comments: The area of positivity as noted on the Mohs map in the previous stage was identified and removed using the Mohs technique. The specimen was taken to the lab and appropriately chromacoded and processed in 1 piece.     Tumor features identified on Mohs section: no tumor identified    Depth of defect: cartilage    Patient tolerance of procedure: tolerated well, no immediate complications    Reconstruction:  Was the defect  reconstructed? Yes    Was reconstruction performed by the same Mohs surgeon? Yes    Setting of reconstruction: outpatient office  When was reconstruction performed? same day  Type of reconstruction: graft  Graft type: full thickness  Graft area (cm2): 2  Subcutaneous Layers (Deep Stitches)   Suture size:  4-0  Suture type:  Vicryl  Stitches:  Buried vertical mattress  Fine/surface layer approximation (top stitches)   Epidermal/Superficial suture size:  5-0  Epidermal/Superficial suture type:  Fast-absorbing gut and Prolene  Stitches: simple interrupted and simple running    Hemostasis achieved with: electrodesiccation  Outcome: patient tolerated procedure well with no complications    Post-procedure details: sterile dressing applied and wound care instructions given    Dressing type: pressure dressing and Xeroform gauze      - Staff Communication: Dermatology Local Anesthesia: Site Location: Left Conchal Bowl 1 % Lidocaine / Epinephrine - Amount: 7.3cc    Full Thickness Skin Graft:  A full thickness skin graft was designed to repair the defect and minimize functional and cosmetic distortion. The wound edges were refreshed to a 90-degree angle. Hemostassis was obtained by electrocautery. The margins of the defect were minimally undermined in all directions. Potential donor sites were evaluated for texture and skin color to achieve the best match. The left abdomen was chosen as the donor site. The graft was then harvested, defatted and washed in saline. After hemostasis was obtained, the donor site was undermined in all directions and closed with two layers of sutures. The graft was removed from the saline bath and defatted with an iris scissor. It was placed into the recipient bed, trimmed to fit and sutured into place with multiple interrupted sutures. The final measurement of the graft is 2.0 cm2.               Wound care was discussed, and the patient was given written post-operative wound care instructions.       The patient will follow up with Nicholas Marvin MD as needed for any post operative problems or concerns, and will follow up with their primary dermatologist as scheduled.       IHERNAN RN   am scribing for, and in the presence of Nicholas Marvin MD    INicholas MD, personally performed the services described in the documentation as scribed by HERNAN DUTTON RN in my presence, and confirm it is both accurate and complete.

## 2025-07-25 NOTE — PROGRESS NOTES
Office Visit Note  Date: 7/25/2025  Surgeon:  Nicholas Marvin MD  Office Location:  [address] [place] [id number] [address]  Dept: [phone]  Dept Fax: [phone]  Referring Provider: Susan L Mayne, FRANK-DIMITRI  [address] [address]    Subjective   Chet Reyes is a 59 y.o. male who presents for the following: MOHS Surgery (SCCIS - Left Conchal Bowl)    According to the patient, the lesion has been present for approximately greater than 1 year at the time of diagnosis.  The lesion is itchy and painful.  The lesion has not been treated previously.    The patient does not have a pacemaker / defibrillator.  The patient does not have a heart valve / joint replacement.    The patient is not on blood thinners.  The patient does not have a history of hepatitis B or C.  The patient does not have a history of HIV.  The patient does not have a history of immunosuppression (e.g. organ transplantation, malignancy, medications)    Review of Systems:  No other skin or systemic complaints other than what is documented elsewhere in the note.    MEDICAL HISTORY: clinically relevant history including significant past medical history, medications and allergies was reviewed and documented in Epic.    Objective   Well appearing patient in no apparent distress; mood and affect are within normal limits.  Vital signs: See record.  Noted on the   Left Conchal Bowl  Is a 1 x 0.9 cm scar    The patient confirmed the identified site.    Discussion:  The nature of the diagnosis was explained. The lesion is a skin cancer.  It has a risk of local growth and distant spread. The condition is associated with sun exposure.  Warning signs of non-melanoma skin cancer discussed. Patient was instructed to perform monthly self skin examination.  We recommended that the patient have regular full skin exams given an increased risk of subsequent skin cancers. The patient was  instructed to use sun protective behaviors including use of broad spectrum sunscreens and sun protective clothing to reduce risk of skin cancers.      Risks, benefits, side effects of Mohs surgery were discussed with patient and the patient voiced understanding.  It was explained that even though the cure rate of Mohs is very high it is not 100%. Risks of surgery including but not limited to bleeding, infection, numbness, nerve damage, and scar were reviewed.  Discussion included wound care requirements, activity restrictions, likely scar outcome and time to heal.     After Mohs surgery, the defect may need to be repaired surgically and the scar may be longer than the original lesion.  Reconstruction options, risks, and benefits were reviewed including second intention healing, linear repair (4-1 ratio was explained), local flaps, skin grafts, cartilage grafts and interpolation flaps (the need for multiple surgeries was explained). Possible outcomes were reviewed including likely scar appearance, failure of flap survival, infection, bleeding and the need for revision surgery.     The pathology was reviewed, the photograph was reviewed, and the referring physician's note was reviewed.    Patient elected for Mohs surgery.     IConchis MA   am scribing for, and in the presence of Nicholas Marvin MD    INicholas MD, personally performed the services described in the documentation as scribed by Conchis Alicea MA in my presence, and confirm it is both accurate and complete.

## 2025-07-31 ENCOUNTER — OFFICE VISIT (OUTPATIENT)
Dept: DERMATOLOGY | Facility: CLINIC | Age: 60
End: 2025-07-31
Payer: MEDICARE

## 2025-07-31 DIAGNOSIS — S01.302A OPEN WOUND OF AURICLE OF LEFT EAR, INITIAL ENCOUNTER: Primary | ICD-10-CM

## 2025-07-31 PROCEDURE — 99024 POSTOP FOLLOW-UP VISIT: CPT | Performed by: STUDENT IN AN ORGANIZED HEALTH CARE EDUCATION/TRAINING PROGRAM

## 2025-07-31 RX ORDER — MUPIROCIN 20 MG/G
OINTMENT TOPICAL DAILY
Qty: 44 G | Refills: 1 | Status: SHIPPED | OUTPATIENT
Start: 2025-07-31 | End: 2025-08-14

## 2025-07-31 NOTE — PROGRESS NOTES
Office Follow Up Note    Visit Summary  Chief Complaint    1. Complaint Wound check.    Chet Reyes is a 59 y.o. male who presents for 1 week follow up after surgery for a squamous cell carcinoma. The wound was repaired with a FTSG. The patient has no concerns today.     Location Operation site location: Left Conchal Bowl    On exam,  Mr. Reyes is well-appearing and in no apparent distress. The surgical site appears on the left conchal bowl is without significant erythema, drainage or tenderness. Graft remains in place and appears viable. Donor site on the left abdomen is without erythema, drainage, edema or tenderness.     Assessment and Plan:  History of skin cancer requiring ongoing monitoring for recurrence and additional lesion development.   The patient was reassured that the wound is healing appropriately.   Sutures from donor site were removed without complication today.  The dressing was removed, the wound cleaned a a new dressing reapplied.   Additionally, biopsy result from the right antihelix was discussed with the patient. The precancerous nature of actinic keratosis was discussed and the biopsy site will be evaluated at a future appointment to consider treatment with cryotherapy.     The patient was advised on the importance of routine skin monitoring including follow up with general dermatology and instructed to call with any further concerns. The patient will return in 2 weeks.      Conchis CROWE MA  am scribing for, and in the presence of MD AMRITA Reece Jake X Wang, MD, personally performed the services described in the documentation as scribed by  Conchis Alicea MA in my presence, and confirm it is both accurate and complete.

## 2025-08-14 ENCOUNTER — APPOINTMENT (OUTPATIENT)
Dept: DERMATOLOGY | Facility: CLINIC | Age: 60
End: 2025-08-14
Payer: MEDICARE

## 2025-08-14 DIAGNOSIS — Z51.89 VISIT FOR WOUND CHECK: ICD-10-CM

## 2025-08-14 PROCEDURE — 99024 POSTOP FOLLOW-UP VISIT: CPT | Performed by: STUDENT IN AN ORGANIZED HEALTH CARE EDUCATION/TRAINING PROGRAM

## 2026-01-05 ENCOUNTER — APPOINTMENT (OUTPATIENT)
Dept: DERMATOLOGY | Facility: CLINIC | Age: 61
End: 2026-01-05
Payer: MEDICARE